# Patient Record
Sex: FEMALE | Race: WHITE | NOT HISPANIC OR LATINO | Employment: FULL TIME | URBAN - METROPOLITAN AREA
[De-identification: names, ages, dates, MRNs, and addresses within clinical notes are randomized per-mention and may not be internally consistent; named-entity substitution may affect disease eponyms.]

---

## 2023-04-30 PROBLEM — D50.9 IRON DEFICIENCY ANEMIA: Status: ACTIVE | Noted: 2023-04-30

## 2023-04-30 PROBLEM — R92.0 ABNORMAL FINDING ON MAMMOGRAPHY, MICROCALCIFICATION: Status: ACTIVE | Noted: 2023-04-30

## 2023-04-30 PROBLEM — E78.00 ELEVATED LDL CHOLESTEROL LEVEL: Status: ACTIVE | Noted: 2023-04-30

## 2023-04-30 PROBLEM — D64.9 ANEMIA: Status: ACTIVE | Noted: 2023-04-30

## 2023-04-30 PROBLEM — R20.9 SENSORY DISTURBANCE: Status: ACTIVE | Noted: 2023-04-30

## 2023-04-30 PROBLEM — N64.82 MICROMASTIA: Status: ACTIVE | Noted: 2023-04-30

## 2023-04-30 PROBLEM — K21.9 GERD (GASTROESOPHAGEAL REFLUX DISEASE): Status: ACTIVE | Noted: 2023-04-30

## 2023-04-30 PROBLEM — E55.9 VITAMIN D DEFICIENCY: Status: ACTIVE | Noted: 2023-04-30

## 2023-04-30 PROBLEM — K40.20 BILATERAL INGUINAL HERNIA: Status: ACTIVE | Noted: 2023-04-30

## 2023-04-30 PROBLEM — G43.909 MIGRAINE HEADACHE: Status: ACTIVE | Noted: 2023-04-30

## 2023-04-30 PROBLEM — R92.8 MAMMOGRAM ABNORMAL: Status: ACTIVE | Noted: 2023-04-30

## 2023-04-30 RX ORDER — ERGOCALCIFEROL 1.25 MG/1
50000 CAPSULE ORAL
COMMUNITY
Start: 2020-04-28 | End: 2023-05-03 | Stop reason: ALTCHOICE

## 2023-04-30 RX ORDER — SUMATRIPTAN SUCCINATE 100 MG/1
100 TABLET ORAL ONCE AS NEEDED
COMMUNITY
Start: 2013-04-16 | End: 2023-05-03 | Stop reason: SDUPTHER

## 2023-04-30 RX ORDER — FERROUS SULFATE 325(65) MG
65 TABLET, DELAYED RELEASE (ENTERIC COATED) ORAL
COMMUNITY
Start: 2017-02-14

## 2023-05-02 NOTE — PROGRESS NOTES
Subjective   Patient ID: Juli Samayoa is a 52 y.o. female who presents for her annual physical     She has moved into an apartment and is really happy. She feels she is an apartment person  She is getting . Her daughter still lives with her   Her children have been supportive     She is planning on scheduling the colonoscopy and a visit with dermatology     She started kick boxing in January     Her HA have improved     She had the Mirena removed and did not replace it   Her periods are sporadic     She is not taking Vitamin D supplements     She wears a panty liner daily for urinary leakage  Sometimes she will use a tampon and that helps with the leakage   She is annoyed by her Sx but is not ready to do anything about it yet     HEALTH:  PAP with Gyn DR Cerrato all good in 2015, 4/27/2020 and Q 5 with me  Mammo 10/16 abnormal and bx 11/16 with Dr Carias and + fibroadenoma , 1/2018, 3/19, 1/2021, 6/2022  BD never   Colon never and ordered 5/2021 and again 5/2022. She is going to see Dr Aaron Moulton cardiac score 2/17 and Zero  EKG 4/18, 5/2021, 5/2023  Urine 1/17 , 4/18, 4/19, 4/2020   FLu 2016, skipped 2017, 10/18, 11/19, 9/2021, 10/2022  TDAP 2014   MMR recommended with travel   Pneumovax never   Prevnar never   Shingrix 10/28/2022 - she was sick the following day and is going to schedule her second vaccine when she is able to be off the next day   Pfizer CVD 3/2/2021 and 3/23/2021 booster 9/26/2021, 5/21/2022, 10/14/2022  Ophth - Last seen in 5/2023. 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 No Hx of glaucoma or MD.          Review of Systems  All systems negative except those listed in the HPI      Past Medical, Surgical, and Family History reviewed and updated in chart.  Reviewed all medications by prescribing practitioner or clinical pharmacist   (such as prescriptions, OTCs, herbal therapies and supplements) and documented in the medical record.    Objective   Visit Vitals  /70 (BP Location:  Left arm, Patient Position: Sitting)   Pulse 68   Temp 36.4 °C (97.6 °F) (Oral)    Body mass index is 28.13 kg/m².     Physical Exam  Vitals reviewed.   Constitutional:       Appearance: Normal appearance.   HENT:      Head: Normocephalic and atraumatic.      Right Ear: Tympanic membrane, ear canal and external ear normal.      Left Ear: Tympanic membrane, ear canal and external ear normal.      Nose: Nose normal.      Mouth/Throat:      Pharynx: Oropharynx is clear.   Eyes:      Conjunctiva/sclera: Conjunctivae normal.   Cardiovascular:      Rate and Rhythm: Normal rate and regular rhythm.      Pulses: Normal pulses.      Heart sounds: Normal heart sounds.   Pulmonary:      Effort: Pulmonary effort is normal.      Breath sounds: Normal breath sounds.   Abdominal:      General: Bowel sounds are normal.      Palpations: Abdomen is soft.   Musculoskeletal:         General: Normal range of motion.      Cervical back: Normal range of motion and neck supple.   Skin:     General: Skin is warm.   Neurological:      General: No focal deficit present.      Mental Status: She is alert and oriented to person, place, and time.   Psychiatric:         Mood and Affect: Mood normal.         Behavior: Behavior normal.         Thought Content: Thought content normal.         Judgment: Judgment normal.       Assessment/Plan   Problem List Items Addressed This Visit       Elevated LDL cholesterol level    Iron deficiency anemia    Migraine headache    Relevant Medications    SUMAtriptan (Imitrex) 100 mg tablet    Vitamin D deficiency    Relevant Orders    Vitamin D, Total    Iron and TIBC     Other Visit Diagnoses       Healthcare maintenance    -  Primary    Relevant Orders    CBC    Comprehensive Metabolic Panel    Lipid Panel    TSH with reflex to Free T4 if abnormal    Vitamin D, Total    Irregular periods/menstrual cycles        Relevant Orders    FSH            Annual physical completed   Annual labs ordered   Medication  reconciliation performed with patient today     Health Maintenence completed  -  Discussed healthy diet and regular exercise.    -  Physical exam overall unremarkable. Immunizations reviewed and updated accordingly. Healthy lifestyle choices discussed (tobacco avoidance, appropriate alcohol use, avoidance of illicit substances).   -  Patient is wearing seatbelt.   -  Screening lab work ordered as indicated.    -  Age appropriate screening tests reviewed with patient.       Her weight in office today is 185 with BMI of 28.13. We spent 15 minutes discussing diet and weight loss. The struggle of weight loss persists   She started kick boxing in 2023    BP have been in normal range in office. We will continue to monitor.  EKG was normal, no LVH or strain pattern noted 2023  Recommend she monitor her BP at home periodically and call with elevated readings     I have spent 15 min face to face with this patient discussing their cardiac risk and behavioral therapies of nutrition choices and exercise. We are trying to eliminate habits that are contributing to their cardiac risk.  We agreed on a plan of how they can reduce their current CV risk   The patient's 10 yr CV risk was estimated at 1.5 %. There is no benefit to adding medications at this time 2023    Elevated lipids: Labs ordered, we will adjust if indicated 2023  Ca Cardiac score was Zero in , there was a small HH noted as well as a hypodense lesion in segment 2 of the liver measuring 3.1 x 2.7 cm.  Mom  from CAD at 62   Recommend she look into the DASH diet     Anemia: Labs ordered and we will adjust if indicated 2023  Continue Ferrous Sulfate 325 mg BID.   Make sure she takes this with something acidic.     Migraines: Improved. Her HA have lessened   Continue Imitrex prn and Naproxen 500 mg BID.   She is able to control her migraines well.  Avoid Topamax because of the brain affects for her     GERD: Resolved   This has been present since  she was diagnosed with the flu in 2/18.   She has hoarseness as well.   The HH may be contributing to her Sx but feel the weight is the issue     S/p laparoscopic bilateral inguinal hernia repair with mesh on 6/19/2020 w/ Dr Avery    She wears a panty liner daily for urinary leakage  Sometimes she will use a tampon and that helps with the leakage   She is annoyed by her Sx but is not ready to do anything about it yet   We will see what her FSH shows. We may consider adding estrogen vaginal cream     Recommend she begin seeing dermatology yearly for skin evaluations   She has noticed more moles on her skin in the past year   She is going to make an appt with derm in 2023.     Vitamin D deficiency:   Continue scripted Vitamin D 50K UT weekly     Sees Dr Silva in Gyn yearly and Pap was normal 4/2020 and Q 5 with me.   She had the Mirena removed in 5/2020.   Her periods are sporadic. FSH ordered 5/2023     Abnormal mammo left 10/16 and fibroma on bx 11/16 with Dr Carias   Mammo + rod normal in 6/2022 and ordered today 5/2023.  Breast exam normal today 5/2023    BD never  Colon never and ordered 5/2021 but not done. She is going to see Dr Walker in 2023    Ophth:  Last seen in 5/2023. 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   She will have her next eye exam faxed to my office in order to update her medical records.       FLu 2016, skipped 2017, 10/18, 11/19, 9/2021, 10/2022   TDAP 2014   MMR recommended with travel   Pneumovax never   Prevnar never   Shingrix 10/28/2022 - she was sick the following day and is going to schedule her second vaccine when she is able to be off the next day getting COVID booster   Pfizer CVD  3/2/2021 and 3/23/2021 booster 9/26/2021, 5/21/2022, 10/14/2022    RTC in 1 year for CPE or sooner if needed     Scribe Attestation  By signing my name below, ILa , Scribe   attest that this documentation has been prepared under the direction and in the presence of Maryjo Strickland MD.

## 2023-05-03 ENCOUNTER — OFFICE VISIT (OUTPATIENT)
Dept: PRIMARY CARE | Facility: CLINIC | Age: 53
End: 2023-05-03
Payer: COMMERCIAL

## 2023-05-03 VITALS
OXYGEN SATURATION: 100 % | BODY MASS INDEX: 28.04 KG/M2 | HEART RATE: 68 BPM | SYSTOLIC BLOOD PRESSURE: 120 MMHG | TEMPERATURE: 97.6 F | WEIGHT: 185 LBS | DIASTOLIC BLOOD PRESSURE: 70 MMHG | HEIGHT: 68 IN

## 2023-05-03 DIAGNOSIS — Z00.00 HEALTHCARE MAINTENANCE: ICD-10-CM

## 2023-05-03 DIAGNOSIS — Z12.83 SKIN CANCER SCREENING: ICD-10-CM

## 2023-05-03 DIAGNOSIS — N92.6 IRREGULAR PERIODS/MENSTRUAL CYCLES: ICD-10-CM

## 2023-05-03 DIAGNOSIS — E55.9 VITAMIN D DEFICIENCY: ICD-10-CM

## 2023-05-03 DIAGNOSIS — D50.9 IRON DEFICIENCY ANEMIA, UNSPECIFIED IRON DEFICIENCY ANEMIA TYPE: ICD-10-CM

## 2023-05-03 DIAGNOSIS — E78.00 ELEVATED LDL CHOLESTEROL LEVEL: Primary | ICD-10-CM

## 2023-05-03 DIAGNOSIS — G43.C1 INTRACTABLE PERIODIC HEADACHE SYNDROME: ICD-10-CM

## 2023-05-03 PROBLEM — D64.9 ANEMIA: Status: RESOLVED | Noted: 2023-04-30 | Resolved: 2023-05-03

## 2023-05-03 PROBLEM — R20.9 SENSORY DISTURBANCE: Status: RESOLVED | Noted: 2023-04-30 | Resolved: 2023-05-03

## 2023-05-03 PROBLEM — R92.0 ABNORMAL FINDING ON MAMMOGRAPHY, MICROCALCIFICATION: Status: RESOLVED | Noted: 2023-04-30 | Resolved: 2023-05-03

## 2023-05-03 PROBLEM — K21.9 GERD (GASTROESOPHAGEAL REFLUX DISEASE): Status: RESOLVED | Noted: 2023-04-30 | Resolved: 2023-05-03

## 2023-05-03 PROBLEM — R92.8 MAMMOGRAM ABNORMAL: Status: RESOLVED | Noted: 2023-04-30 | Resolved: 2023-05-03

## 2023-05-03 PROCEDURE — 99396 PREV VISIT EST AGE 40-64: CPT | Performed by: INTERNAL MEDICINE

## 2023-05-03 PROCEDURE — 1036F TOBACCO NON-USER: CPT | Performed by: INTERNAL MEDICINE

## 2023-05-03 PROCEDURE — 93000 ELECTROCARDIOGRAM COMPLETE: CPT | Performed by: INTERNAL MEDICINE

## 2023-05-03 RX ORDER — BENZONATATE 100 MG/1
100 CAPSULE ORAL EVERY 8 HOURS PRN
COMMUNITY
Start: 2018-02-03 | End: 2023-05-03 | Stop reason: ALTCHOICE

## 2023-05-03 RX ORDER — SUMATRIPTAN SUCCINATE 100 MG/1
TABLET ORAL
Qty: 9 TABLET | Refills: 3 | Status: SHIPPED | OUTPATIENT
Start: 2023-05-03 | End: 2023-07-24

## 2023-05-03 RX ORDER — GUAIFENESIN 600 MG/1
600 TABLET, EXTENDED RELEASE ORAL 2 TIMES DAILY
COMMUNITY
Start: 2018-02-03 | End: 2023-05-03 | Stop reason: ALTCHOICE

## 2023-07-12 ENCOUNTER — TELEPHONE (OUTPATIENT)
Dept: PRIMARY CARE | Facility: CLINIC | Age: 53
End: 2023-07-12
Payer: COMMERCIAL

## 2023-07-12 DIAGNOSIS — Z12.11 SCREEN FOR COLON CANCER: Primary | ICD-10-CM

## 2023-07-24 DIAGNOSIS — G43.C1 INTRACTABLE PERIODIC HEADACHE SYNDROME: ICD-10-CM

## 2023-07-24 RX ORDER — SUMATRIPTAN SUCCINATE 100 MG/1
TABLET ORAL
Qty: 27 TABLET | Refills: 3 | Status: SHIPPED | OUTPATIENT
Start: 2023-07-24

## 2023-07-24 RX ORDER — SUMATRIPTAN SUCCINATE 100 MG/1
TABLET ORAL
Qty: 27 TABLET | Refills: 3 | Status: SHIPPED | OUTPATIENT
Start: 2023-07-24 | End: 2023-07-24 | Stop reason: SDUPTHER

## 2023-10-27 ENCOUNTER — OFFICE VISIT (OUTPATIENT)
Dept: DERMATOLOGY | Facility: CLINIC | Age: 53
End: 2023-10-27

## 2023-10-27 DIAGNOSIS — L91.8 SKIN TAG: ICD-10-CM

## 2023-10-27 DIAGNOSIS — L82.1 SEBORRHEIC KERATOSIS: Primary | ICD-10-CM

## 2023-10-27 PROCEDURE — 1036F TOBACCO NON-USER: CPT | Performed by: DERMATOLOGY

## 2023-10-27 PROCEDURE — 17111 DESTRUCTION B9 LESIONS 15/>: CPT | Performed by: DERMATOLOGY

## 2023-10-27 ASSESSMENT — DERMATOLOGY QUALITY OF LIFE (QOL) ASSESSMENT
RATE HOW BOTHERED YOU ARE BY SYMPTOMS OF YOUR SKIN PROBLEM (EG, ITCHING, STINGING BURNING, HURTING OR SKIN IRRITATION): 0 - NEVER BOTHERED
RATE HOW BOTHERED YOU ARE BY EFFECTS OF YOUR SKIN PROBLEMS ON YOUR ACTIVITIES (EG, GOING OUT, ACCOMPLISHING WHAT YOU WANT, WORK ACTIVITIES OR YOUR RELATIONSHIPS WITH OTHERS): 0 - NEVER BOTHERED
ARE THERE EXCLUSIONS OR EXCEPTIONS FOR THE QUALITY OF LIFE ASSESSMENT: NO
RATE HOW EMOTIONALLY BOTHERED YOU ARE BY YOUR SKIN PROBLEM (FOR EXAMPLE, WORRY, EMBARRASSMENT, FRUSTRATION): 0 - NEVER BOTHERED
WHAT SINGLE SKIN CONDITION LISTED BELOW IS THE PATIENT ANSWERING THE QUALITY-OF-LIFE ASSESSMENT QUESTIONS ABOUT: NONE OF THE ABOVE

## 2023-10-27 ASSESSMENT — PATIENT GLOBAL ASSESSMENT (PGA): PATIENT GLOBAL ASSESSMENT: PATIENT GLOBAL ASSESSMENT:  1 - CLEAR

## 2023-10-27 ASSESSMENT — DERMATOLOGY PATIENT ASSESSMENT
DO YOU USE SUNSCREEN: DAILY
FOR PATIENTS COMING IN FOR A FOLLOW-UP VISIT - HAVE THERE BEEN ANY CHANGES IN YOUR HEALTH SINCE YOUR LAST VISIT: NO
ARE YOU TRYING TO GET PREGNANT: NO
DO YOU USE A TANNING BED: NO
DO YOU HAVE ANY NEW OR CHANGING LESIONS: NO
ARE YOU AN ORGAN TRANSPLANT RECIPIENT: NO

## 2023-10-27 ASSESSMENT — ITCH NUMERIC RATING SCALE: HOW SEVERE IS YOUR ITCHING?: 0

## 2023-10-27 NOTE — PROGRESS NOTES
Subjective     Juli Samayoa is a 53 y.o. female who presents for the following: Cosmetic (Pt is here to have lesions removed.Lesions are located on neck. ).     Review of Systems:  No other skin or systemic complaints other than what is documented elsewhere in the note.    The following portions of the chart were reviewed this encounter and updated as appropriate:          Skin Cancer History  No skin cancer on file.      Specialty Problems    None       Objective   Well appearing patient in no apparent distress; mood and affect are within normal limits.    A focused skin examination was performed. All findings within normal limits unless otherwise noted below.    Assessment/Plan   1. Seborrheic keratosis (20)  Neck - Anterior (20)  1-2mm brown stuck on appearing papules on the neck    The benign nature of these skin lesions reviewed, reassure provided and no further treatment needed at this time.   These lesions can be removed, if symptomatic (itching, bleeding, rubbing on clothing, painful), otherwise removal is considered cosmetic.     Destr of lesion - Neck - Anterior    Destruction method: electrodesiccation  Informed consent: discussed and consent obtained    Outcome: patient tolerated procedure well with no complications, wound care instructions provided    2. Skin tag (3)  Neck - Anterior (3)  Fleshy, skin-colored sessile and pedunculated papules.     Benign growths that most commonly occur in areas of friction and rubbing, specifically the neck, axilla, and inguinal creases. No treatment is necessary. If lesions are symptomatic, they can be removed, however regardless of symptoms some insurances may not cover this procedure.    Destr of lesion - Neck - Anterior (3)  Complexity: simple    Destruction method: scissors  Informed consent: discussed and consent obtained    Anesthesia: the lesion was anesthetized in a standard fashion    Anesthetic:  1% lidocaine w/ epinephrine 1-100,000 local  infiltration  Hemostasis achieved with:  electrodesiccation  Outcome: patient tolerated procedure with difficulty    Post-procedure details: vaseline and bandage applied. wound care instructions given     Follow up as needed.

## 2024-05-06 ENCOUNTER — APPOINTMENT (OUTPATIENT)
Dept: PRIMARY CARE | Facility: CLINIC | Age: 54
End: 2024-05-06

## 2024-06-09 DIAGNOSIS — Z00.00 HEALTHCARE MAINTENANCE: Primary | ICD-10-CM

## 2024-06-09 DIAGNOSIS — D50.9 IRON DEFICIENCY ANEMIA, UNSPECIFIED IRON DEFICIENCY ANEMIA TYPE: ICD-10-CM

## 2024-06-09 DIAGNOSIS — Z12.31 VISIT FOR SCREENING MAMMOGRAM: ICD-10-CM

## 2024-06-12 NOTE — PROGRESS NOTES
Subjective   Patient ID: Juli Samayoa is a 54 y.o. female who presents for her annual physical       She is . She has a 10 y/o child.  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     She saw podiatry in 12/23 and Dx with plantar wart   She had the wart treated       HEALTH:  PAP  2015, 4/20 and Q 5 with me  - She had the Mirena removed in 5/2020.   Mammo 1/18, 3/19, 1/21, 6/22  - Mammo 10/16 abnl and bx 11/16 fibroadenoma,    BD never   Colon never and ordered 5/2021, 5/2022 and again 6/2023  Ca cardiac score 2/17 and Zero  EKG 4/18, 5/21, 5/23  Urine 1/17, 4/18, 4/19, 4/20   Hep C ordered 6/2024   FLu  10/18, 11/19, 9/21, 10/22, 10/23  TDAP 2014, 6/13/2024  Pneumovax never   Prevnar never   Shingrix 10/28/2022 and 5/18/2023   Pfizer CVD 3/21 and 3/21 booster 9/21, 5/22, 10/22, 10/23  Ophth - Last seen in 5/2023. 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 No Hx of glaucoma or MD.       Review of Systems  All systems negative except those listed in the HPI      Past Medical, Surgical, and Family History reviewed and updated in chart.  Reviewed all medications by prescribing practitioner or clinical pharmacist   (such as prescriptions, OTCs, herbal therapies and supplements) and documented in the medical record       Objective   Visit Vitals  /60 (BP Location: Left arm, Patient Position: Sitting)   Pulse 82   Temp 36.3 °C (97.4 °F)    Body mass index is 29.98 kg/m².      Physical Exam  Vitals reviewed.   Constitutional:       Appearance: Normal appearance.   HENT:      Head: Normocephalic.      Right Ear: Tympanic membrane, ear canal and external ear normal.      Left Ear: Tympanic membrane, ear canal and external ear normal.      Nose: Nose normal.      Mouth/Throat:      Pharynx: Oropharynx is clear.   Eyes:      Conjunctiva/sclera: Conjunctivae normal.    Cardiovascular:      Rate and Rhythm: Normal rate and regular rhythm.      Pulses: Normal pulses.      Heart sounds: Normal heart sounds.   Pulmonary:      Effort: Pulmonary effort is normal.      Breath sounds: Normal breath sounds.   Abdominal:      General: Bowel sounds are normal.      Palpations: Abdomen is soft.   Musculoskeletal:         General: Normal range of motion.      Cervical back: Normal range of motion and neck supple.   Skin:     General: Skin is warm.   Neurological:      General: No focal deficit present.      Mental Status: She is alert and oriented to person, place, and time.   Psychiatric:         Mood and Affect: Mood normal.         Behavior: Behavior normal.         Thought Content: Thought content normal.         Judgment: Judgment normal.       Assessment/Plan   Problem List Items Addressed This Visit       Elevated LDL cholesterol level    Migraine headache     Other Visit Diagnoses       Healthcare maintenance    -  Primary    Relevant Orders    Tdap vaccine, age 7 years and older  (BOOSTRIX)    Encounter for hepatitis C screening test for low risk patient        Relevant Orders    Hepatitis C antibody    Colon cancer screening        Relevant Orders    Colonoscopy Screening; Average Risk Patient            Annual physical completed   Annual labs ordered        Health Maintenence completed  -  Discussed healthy diet and regular exercise.    -  Physical exam overall unremarkable. Immunizations reviewed and updated accordingly. Healthy lifestyle choices discussed (tobacco avoidance, appropriate alcohol use, avoidance of illicit substances).   -  Patient is wearing seatbelt.   -  Screening lab work ordered as indicated.    -  Age appropriate screening tests reviewed with patient.       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 We spent 15 minutes discussing diet and weight loss. The struggle of weight loss persists   Recommend she look into a plant based/ whole foods diet      BP have been in normal range in office. We will continue to monitor.  EKG was normal, no LVH or strain pattern noted 5/2023  Recommend she monitor her BP at home periodically and call with elevated readings      I have spent 15 min face to face with this patient discussing their cardiac risk  We discussed the patients cardiovascular risk. If needed, lifestyle modifications recommended including: behavioral therapies of nutrition choices, exercise and eliminate habits that are contributing to their cardiac risk. We agreed to a plan to decrease his cardiovascular risks. Discussed ASA. Reviewed Guidelines and approved recommendations made to patient.   The patient's 10 yr CV risk was estimated at  1.7 % 6/2024      Elevated lipids: Labs ordered, we will adjust if indicated 6/2024  Mom passed from CAD at 62    Explained goal for LDL to be less than 100 and ideally less than 70  Explained I would like to see her HDL between 55-60. Increase exercise   Ca Cardiac score was Zero in 2/17, there was a small HH noted as well as a hypodense lesion in segment 2 of the liver measuring 3.1 x 2.7 cm.  Recommend she look into a plant based/ whole foods diet      Anemia: Labs ordered and we will adjust if indicated 6/2024  She is no longer taking ferrous Sulfate   Recommend she take the iron supplement with something acidic.      Migraines: Improved. Her HA have improved   Continue Imitrex prn and Naproxen 500 mg BID.   She is able to control her migraines well.  Avoid Topamax because of the brain affects for her      - S/p lap bilateral inguinal hernia repair with mesh on 6/19/2020 w/ Dr Avery     Urinary leakage:   She wears a panty liner daily for urinary leakage  Sometimes she will use a tampon and that helps with the leakage   She is annoyed by  her Sx but is not ready to do anything about it yet     She saw podiatry in 12/23 and Dx with plantar wart   She had the wart treated   Explained she has to get the black spot out of the wart for it to resolve  She will return to podiatry for evaluation   Recommend she take an OTC Vitamin A until wart removes and stop. Recommend OTC Vitamin A 10K UT daily      Multiple seborrheic keratosis on neck and removal of anterior mole anterior and 3 skin tags removed from anterior neck with Dr Mora 10/23     Vitamin D deficiency:   Continue scripted Vitamin D 50K UT weekly      Pap was normal 4/2020 and Q 5 with me.   She had the Mirena removed in 5/2020.   Her periods are sporadic.       Abnormal mammo left 10/16 and fibroma on bx 11/16 with Dr Carias   Mammo + rod normal in 6/2022 and ordered today 5/2023.  Breast exam normal 6/2023      BD never  Colon never and ordered 5/2021, 5/2022 and again 6/2023      Ophth:  Last seen in 5/2023. 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   She will have her next eye exam faxed to my office in order to update her medical records.      Hep C ordered 6/2024   FLu  10/18, 11/19, 9/21, 10/22, 10/23  TDAP 2014, 6/13/2024  Pneumovax never   Prevnar never   Shingrix 10/28/2022 and 5/18/2023   Pfizer CVD 3/21 and 3/21 booster 9/21, 5/22, 10/22, 10/23    Some elements in the chart were copied from Dr. Strickland's last office visit with patient.   Notes have been updated where appropriate, and reflect my current medical decision making from today.        RTC in 1 year for CPE or sooner if needed   (CPE due 6/2025)      Scribe Attestation  By signing my name below, I, La Conte , Scribe   attest that this documentation has been prepared under the direction and in the presence of Maryjo Strickland MD.

## 2024-06-13 ENCOUNTER — APPOINTMENT (OUTPATIENT)
Dept: PRIMARY CARE | Facility: CLINIC | Age: 54
End: 2024-06-13
Payer: COMMERCIAL

## 2024-06-13 ENCOUNTER — LAB (OUTPATIENT)
Dept: LAB | Facility: LAB | Age: 54
End: 2024-06-13
Payer: COMMERCIAL

## 2024-06-13 VITALS
HEART RATE: 82 BPM | BODY MASS INDEX: 30.07 KG/M2 | HEIGHT: 69 IN | WEIGHT: 203 LBS | TEMPERATURE: 97.4 F | DIASTOLIC BLOOD PRESSURE: 60 MMHG | OXYGEN SATURATION: 98 % | SYSTOLIC BLOOD PRESSURE: 100 MMHG

## 2024-06-13 DIAGNOSIS — E78.00 ELEVATED LDL CHOLESTEROL LEVEL: ICD-10-CM

## 2024-06-13 DIAGNOSIS — D50.9 IRON DEFICIENCY ANEMIA, UNSPECIFIED IRON DEFICIENCY ANEMIA TYPE: ICD-10-CM

## 2024-06-13 DIAGNOSIS — Z11.59 ENCOUNTER FOR HEPATITIS C SCREENING TEST FOR LOW RISK PATIENT: ICD-10-CM

## 2024-06-13 DIAGNOSIS — Z12.11 COLON CANCER SCREENING: ICD-10-CM

## 2024-06-13 DIAGNOSIS — Z00.00 HEALTHCARE MAINTENANCE: Primary | ICD-10-CM

## 2024-06-13 DIAGNOSIS — G43.019 INTRACTABLE MIGRAINE WITHOUT AURA AND WITHOUT STATUS MIGRAINOSUS: ICD-10-CM

## 2024-06-13 DIAGNOSIS — Z00.00 HEALTHCARE MAINTENANCE: ICD-10-CM

## 2024-06-13 LAB
ALBUMIN SERPL BCP-MCNC: 4.4 G/DL (ref 3.4–5)
ALP SERPL-CCNC: 69 U/L (ref 33–110)
ALT SERPL W P-5'-P-CCNC: 13 U/L (ref 7–45)
ANION GAP SERPL CALC-SCNC: 12 MMOL/L (ref 10–20)
AST SERPL W P-5'-P-CCNC: 14 U/L (ref 9–39)
BILIRUB SERPL-MCNC: 0.4 MG/DL (ref 0–1.2)
BUN SERPL-MCNC: 19 MG/DL (ref 6–23)
CALCIUM SERPL-MCNC: 9.7 MG/DL (ref 8.6–10.6)
CHLORIDE SERPL-SCNC: 105 MMOL/L (ref 98–107)
CHOLEST SERPL-MCNC: 200 MG/DL (ref 0–199)
CHOLESTEROL/HDL RATIO: 3.5
CO2 SERPL-SCNC: 30 MMOL/L (ref 21–32)
CREAT SERPL-MCNC: 0.74 MG/DL (ref 0.5–1.05)
EGFRCR SERPLBLD CKD-EPI 2021: >90 ML/MIN/1.73M*2
ERYTHROCYTE [DISTWIDTH] IN BLOOD BY AUTOMATED COUNT: 14.4 % (ref 11.5–14.5)
FERRITIN SERPL-MCNC: 15 NG/ML (ref 8–150)
GLUCOSE SERPL-MCNC: 93 MG/DL (ref 74–99)
HCT VFR BLD AUTO: 40.9 % (ref 36–46)
HCV AB SER QL: NONREACTIVE
HDLC SERPL-MCNC: 56.5 MG/DL
HGB BLD-MCNC: 12.4 G/DL (ref 12–16)
IRON SATN MFR SERPL: 10 % (ref 25–45)
IRON SERPL-MCNC: 42 UG/DL (ref 35–150)
LDLC SERPL CALC-MCNC: 130 MG/DL
MCH RBC QN AUTO: 26.7 PG (ref 26–34)
MCHC RBC AUTO-ENTMCNC: 30.3 G/DL (ref 32–36)
MCV RBC AUTO: 88 FL (ref 80–100)
NON HDL CHOLESTEROL: 144 MG/DL (ref 0–149)
NRBC BLD-RTO: 0 /100 WBCS (ref 0–0)
PLATELET # BLD AUTO: 288 X10*3/UL (ref 150–450)
POTASSIUM SERPL-SCNC: 4 MMOL/L (ref 3.5–5.3)
PROT SERPL-MCNC: 6.7 G/DL (ref 6.4–8.2)
RBC # BLD AUTO: 4.64 X10*6/UL (ref 4–5.2)
SODIUM SERPL-SCNC: 143 MMOL/L (ref 136–145)
TIBC SERPL-MCNC: 429 UG/DL (ref 240–445)
TRIGL SERPL-MCNC: 69 MG/DL (ref 0–149)
TSH SERPL-ACNC: 1.31 MIU/L (ref 0.44–3.98)
UIBC SERPL-MCNC: 387 UG/DL (ref 110–370)
VLDL: 14 MG/DL (ref 0–40)
WBC # BLD AUTO: 4.8 X10*3/UL (ref 4.4–11.3)

## 2024-06-13 PROCEDURE — 84443 ASSAY THYROID STIM HORMONE: CPT

## 2024-06-13 PROCEDURE — 36415 COLL VENOUS BLD VENIPUNCTURE: CPT

## 2024-06-13 PROCEDURE — 85027 COMPLETE CBC AUTOMATED: CPT

## 2024-06-13 PROCEDURE — 83540 ASSAY OF IRON: CPT

## 2024-06-13 PROCEDURE — 80053 COMPREHEN METABOLIC PANEL: CPT

## 2024-06-13 PROCEDURE — 82728 ASSAY OF FERRITIN: CPT

## 2024-06-13 PROCEDURE — 83550 IRON BINDING TEST: CPT

## 2024-06-13 PROCEDURE — 86803 HEPATITIS C AB TEST: CPT

## 2024-06-13 PROCEDURE — 80061 LIPID PANEL: CPT

## 2024-06-13 ASSESSMENT — PATIENT HEALTH QUESTIONNAIRE - PHQ9
SUM OF ALL RESPONSES TO PHQ9 QUESTIONS 1 AND 2: 0
2. FEELING DOWN, DEPRESSED OR HOPELESS: NOT AT ALL
1. LITTLE INTEREST OR PLEASURE IN DOING THINGS: NOT AT ALL

## 2024-06-13 NOTE — PATIENT INSTRUCTIONS
Current weight: 92.1 kg (203 lb)  Weight change since last visit (-) denotes wt gain 18 lbs   Weight loss needed to achieve BMI 25: 34.1 Lbs  Weight loss needed to achieve BMI 30: 0.3 Lbs    It was a pleasure to see you today.  I would like to remind you about importance of a healthy lifestyle in order to improve your well-being and live longer. Try to engage in physical activities for at least 150 minutes per week.  Eat about 10 servings of fruits and vegetables daily. My advice is 2 servings of fruits and 8 servings of vegetables. For vegetables choose at least half of them green and at least half of them fresh.  Please avoid sugar, salt, fried food and saturated fat.  Weight loss is advised. Target BMI: below 25. Please follow low carbohydrate diet and daily exercise routine for at least 30 minutes. Nutritional consultation is available, please let me know if you are interested. I will be happy to discuss details with you if interested.   Have a good day and stay well.      Obesity is a chronic, relapsing, progressive, treatable, multifactorial, neurobehavioral disease, where in an increase in body fat promotes adipose (fat) tissue dysfunction, as well as biomechanical stress on the body which can result in adverse metabolic, biomechanical, and psychosocial health consequences, in addition to reducing quality of life and lifespan.   Without treatment, obesity is likely to progress and worsen, further increase the patient's risk for numerous health conditions caused by excess adiposity and increasing the risk for premature death.     - Counseling provided on impact of diet, physical activity, stress & sleep in treatment of obesity.    - Counseled on reduced calorie, high protein, high fiber, whole foods diet.  Counseling on benefits of avoidance of frequent intake of processed foods and liquid calories.   - Counseled on behavioral modification strategies and tools to support weight loss.   - Reviewed  pathophysiology of obesity in context of relationship to nutrition, energy balance and environmental factors that influence nutrition and energy balance outcomes.  - Counseled on various behavioral strategies and self monitoring practices to enhance understanding and individual assessment of energy balance, how various changes in types of foods consumed and macronutrient distribution can impact appetite regulation and be used as a tool in treatment of obesity.       The ability to age comfortably depends on how you invest in your body.   Include physical activity in your daily routine. Physical activity increases blood flow to your whole body, including your brain. ...  Eat a healthy diet. A heart-healthy diet may benefit your brain.   Stay mentally active. Be social.   Treat cardiovascular disease.  No smoking, excessive EtOH intake or illicit drug use.

## 2024-06-14 NOTE — RESULT ENCOUNTER NOTE
Hi Juli-  The iron levels are  a little on the low side still but the anemia is totally resolved now   The cholesterol is adequate but ideally want the LDL <100   Thyroid is good range   Hep C ab is negative   Rest of the labs are good range

## 2024-06-17 DIAGNOSIS — Z12.11 SCREENING FOR COLON CANCER: ICD-10-CM

## 2024-06-17 RX ORDER — POLYETHYLENE GLYCOL 3350, SODIUM SULFATE ANHYDROUS, SODIUM BICARBONATE, SODIUM CHLORIDE, POTASSIUM CHLORIDE 236; 22.74; 6.74; 5.86; 2.97 G/4L; G/4L; G/4L; G/4L; G/4L
4000 POWDER, FOR SOLUTION ORAL ONCE
Qty: 4000 ML | Refills: 0 | Status: SHIPPED | OUTPATIENT
Start: 2024-06-17 | End: 2024-06-17

## 2024-06-28 ENCOUNTER — APPOINTMENT (OUTPATIENT)
Dept: RADIOLOGY | Facility: CLINIC | Age: 54
End: 2024-06-28
Payer: COMMERCIAL

## 2024-07-11 ENCOUNTER — APPOINTMENT (OUTPATIENT)
Dept: GASTROENTEROLOGY | Facility: HOSPITAL | Age: 54
End: 2024-07-11
Payer: COMMERCIAL

## 2024-07-12 ENCOUNTER — HOSPITAL ENCOUNTER (OUTPATIENT)
Dept: RADIOLOGY | Facility: CLINIC | Age: 54
Discharge: HOME | End: 2024-07-12
Payer: COMMERCIAL

## 2024-07-12 VITALS — HEIGHT: 69 IN | BODY MASS INDEX: 29.62 KG/M2 | WEIGHT: 200 LBS

## 2024-07-12 DIAGNOSIS — Z12.31 VISIT FOR SCREENING MAMMOGRAM: ICD-10-CM

## 2024-07-12 PROCEDURE — 77067 SCR MAMMO BI INCL CAD: CPT

## 2024-08-21 DIAGNOSIS — G43.C1 INTRACTABLE PERIODIC HEADACHE SYNDROME: ICD-10-CM

## 2024-08-21 RX ORDER — SUMATRIPTAN SUCCINATE 100 MG/1
TABLET ORAL
Qty: 27 TABLET | Refills: 3 | Status: SHIPPED | OUTPATIENT
Start: 2024-08-21

## 2024-10-29 ENCOUNTER — TELEPHONE (OUTPATIENT)
Dept: PRIMARY CARE | Facility: CLINIC | Age: 54
End: 2024-10-29
Payer: COMMERCIAL

## 2024-10-29 DIAGNOSIS — R30.0 DYSURIA: Primary | ICD-10-CM

## 2024-10-29 RX ORDER — NITROFURANTOIN 25; 75 MG/1; MG/1
100 CAPSULE ORAL 2 TIMES DAILY
Qty: 14 CAPSULE | Refills: 0 | Status: SHIPPED | OUTPATIENT
Start: 2024-10-29 | End: 2024-11-05

## 2025-06-18 NOTE — PROGRESS NOTES
Subjective   Patient ID: Juli Samayoa is a 55 y.o. female who presents for her annual physical       She complains of congestion, rhinorrhea with thick mucous production   Her Sx have been present since approx 5/29/25. She did an Abx for 5 days   She has been afebrile. Now, she is more bothered by ST     She will make an appt with gyn 2025  Her periods are sporadic   She has no issues with vaginal dryness       HEALTH:  PAP  2015, 4/20 and Q 5   - She had the Mirena removed in 5/2020.   Mammo 1/18, 3/19, 1/21, 6/22, 7/24, ordered 6/25  - Mammo 10/16 abnl and bx 11/16 fibroadenoma,    BD never   Colon 7/24 nl and Q 10    Ca cardiac score 2/17 and Zero  EKG 4/18, 5/21, 5/23- repeat 2026  Urine 1/17, 4/18, 4/19, 4/20   Hep C normal 6/2024   FLu  11/19, 9/21, 10/22, 10/23, 10/24  TDAP 2014, 6/13/2024  Pneumovax never   Prevnar never   Shingrix 10/28/2022 and 5/18/2023   SARS-CoV-2-  3/21, 3/21, 9/21, 5/22, 10/22, 10/23, 10/24   Ophth - Last seen in 5/2023. 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 No Hx of glaucoma or MD.        Review of Systems  All systems negative except those listed in the HPI      Past Medical, Surgical, and Family History reviewed and updated in chart.  Reviewed all medications by prescribing practitioner or clinical pharmacist   (such as prescriptions, OTCs, herbal therapies and supplements) and documented in the medical record      Objective   Visit Vitals  /60 (BP Location: Right arm, Patient Position: Sitting)   Pulse 70   Temp 36.1 °C (97 °F) (Temporal)    Body mass index is 29.98 kg/m².     Physical Exam  Vitals reviewed.   Constitutional:       Appearance: Normal appearance.   HENT:      Head: Normocephalic.      Right Ear: Tympanic membrane and external ear normal.      Left Ear: Tympanic membrane, ear canal and external ear normal.      Ears:      Comments: Erythema right ear canal     Nose: Nose normal.      Mouth/Throat:      Pharynx: Oropharynx is clear.   Eyes:       Conjunctiva/sclera: Conjunctivae normal.   Cardiovascular:      Rate and Rhythm: Normal rate and regular rhythm.      Pulses: Normal pulses.      Heart sounds: Normal heart sounds.   Pulmonary:      Breath sounds: Normal breath sounds. No wheezing.   Chest:   Breasts:     Right: Normal.      Left: Normal.   Abdominal:      General: Bowel sounds are normal.      Palpations: Abdomen is soft.   Musculoskeletal:         General: Normal range of motion.      Cervical back: Normal range of motion and neck supple.   Skin:     General: Skin is warm.   Neurological:      General: No focal deficit present.      Mental Status: She is alert and oriented to person, place, and time.   Psychiatric:         Mood and Affect: Mood normal.         Behavior: Behavior normal.         Thought Content: Thought content normal.         Judgment: Judgment normal.       Assessment/Plan   Problem List Items Addressed This Visit       Elevated LDL cholesterol level    Migraine headache    Urinary incontinence     Other Visit Diagnoses         Healthcare maintenance    -  Primary    Relevant Orders    CBC    Comprehensive Metabolic Panel    TSH with reflex to Free T4 if abnormal    BI mammo bilateral screening tomosynthesis      Visit for screening mammogram        Relevant Orders    BI mammo bilateral screening tomosynthesis      Lipid screening        Relevant Orders    Lipid Panel      Diabetes mellitus screening        Relevant Orders    Hemoglobin A1C      Amenorrhea        Relevant Orders    FSH            Annual physical completed   Annual labs ordered         Health Maintenence completed  -  Discussed healthy diet and regular exercise.    -  Physical exam overall unremarkable. Immunizations reviewed and updated accordingly. Healthy lifestyle choices discussed (tobacco avoidance, appropriate alcohol use, avoidance of illicit substances).   -  Patient is wearing seatbelt.   -  Screening lab work ordered as indicated.    -  Age appropriate  screening tests reviewed with patient.        She is  with 5 children. She denies previous history of tobacco use   She owns a [employer] company   She is travelling a lot and living her best life     Acute UR Sx: On exam: erythema right ear canal, pulm exam normal 6/25. No infection noted, no Abx prescribed. Explained this could be allergies   She complains of congestion, rhinorrhea with thick mucous production   Her Sx have been present since approx 5/29/25. She did an Abx for 5 days   She has been afebrile. Now, she is more bothered by ST   Recommend OTC Flonase NS QHS until Sx resolve   Encouraged rest and increased hydration with warm/ tepid fluids   She will call if Sx persist or worsen      Her weight in office is 203 with BMI of 29.98, recommend she maintain  I would like to see her BMI as close to 25 as possible   Recommend she look into a plant based/ whole foods diet      BP have been in normal range in office. We will continue to monitor.  EKG 5/23 normal, no LVH or strain pattern noted    Recommend she monitor her BP at home and call with elevated readings      I have spent 15 min face to face with this patient discussing their cardiac risk  We discussed the patients cardiovascular risk. If needed, lifestyle modifications recommended including: behavioral therapies of nutrition choices, exercise and eliminate habits that are contributing to their cardiac risk. We agreed to a plan to decrease his cardiovascular risks. Discussed ASA. Reviewed Guidelines and approved recommendations made to patient.   The patient's 10 yr CV risk was estimated at  1.5 % IO 6/25     Elevated lipids: Labs ordered, we will adjust if indicated 6/25  Mom passed from CAD at 62    Explained goal for LDL to be less than 100 and ideally less than 70  Explained I would like to see her HDL between 55-60. Increase exercise   Ca Cardiac score was Zero in 2/17, there was a small HH noted as well as a hypodense lesion in segment 2  of the liver measuring 3.1 x 2.7 cm.  Recommend she look into a plant based/ whole foods diet      Anemia: Labs ordered and we will adjust if indicated 6/25  She is no longer taking ferrous Sulfate   Recommend she take the iron supplement with something acidic.      Migraines: Improved. Her HA have improved   She is no longer taking Imitrex prn and Naproxen    She failed Topamax due to brain effects   She is able to control her migraines well.     - S/p lap bilateral inguinal hernia repair with mesh on 6/19/2020 w/ Dr Avery     Urinary leakage:   She wears a panty liner daily for urinary leakage  Sometimes she will use a tampon and that helps with the leakage   She is annoyed by her Sx but is not ready to do anything about it yet      She saw podiatry in 12/23 and Dx with plantar wart   She had the wart treated   Explained she has to get the black spot out of the wart for it to resolve  She will return to podiatry for evaluation   Recommend OTC Vitamin A 10K UT daily      Multiple seborrheic keratosis on neck and removal of mole and 3 skin tags from anterior neck with Dr Mora 10/23     Vitamin D deficiency:   Discontinue scripted Vitamin D due to concerns for bone loss with higher doses   Recommend taking OTC Vitamin D3 5000 UT daily      Pap was normal 4/2020 and Q 5. She will make an appt with gyn 2025   She had the Mirena removed in 5/2020.   Her periods are sporadic. FSH ordered 6/25     Abnormal mammo left 10/16 and fibroma on bx 11/16 with Dr Carias   Mammo normal in 7/24 and ordered 6/25  Breast exam normal 6/25      BD never    Colon 7/24 normal and Q 10      Ophth:  Last seen in 5/2023. 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   She will have her next eye exam faxed to my office in order to update her medical records.      Hep C normal 6/2024   FLu  11/19, 9/21, 10/22, 10/23, 10/24  TDAP 2014, 6/13/2024  Pneumovax never   Prevnar never   Shingrix 10/28/2022 and 5/18/2023   SARS-CoV-2-  3/21, 3/21, 9/21,  5/22, 10/22, 10/23, 10/24      Some elements in the chart were copied from Dr. Strickland's last office visit with patient. Notes have been updated where appropriate, and reflect my current medical decision making from today.        RTC in 1 year for CPE or sooner if needed   (CPE due 6/26)      Scribe Attestation  By signing my name below, I, La Dg, Scribe attest that this documentation has been prepared under the direction and in the presence of Maryjo Strickland MD.

## 2025-06-19 ENCOUNTER — APPOINTMENT (OUTPATIENT)
Dept: PRIMARY CARE | Facility: CLINIC | Age: 55
End: 2025-06-19
Payer: COMMERCIAL

## 2025-06-19 VITALS
BODY MASS INDEX: 30.07 KG/M2 | WEIGHT: 203 LBS | DIASTOLIC BLOOD PRESSURE: 60 MMHG | HEART RATE: 70 BPM | SYSTOLIC BLOOD PRESSURE: 118 MMHG | OXYGEN SATURATION: 98 % | TEMPERATURE: 97 F | HEIGHT: 69 IN

## 2025-06-19 DIAGNOSIS — Z12.31 VISIT FOR SCREENING MAMMOGRAM: ICD-10-CM

## 2025-06-19 DIAGNOSIS — N91.2 AMENORRHEA: ICD-10-CM

## 2025-06-19 DIAGNOSIS — R32 URINARY INCONTINENCE, UNSPECIFIED TYPE: ICD-10-CM

## 2025-06-19 DIAGNOSIS — Z13.220 LIPID SCREENING: ICD-10-CM

## 2025-06-19 DIAGNOSIS — E78.00 ELEVATED LDL CHOLESTEROL LEVEL: ICD-10-CM

## 2025-06-19 DIAGNOSIS — G43.019 INTRACTABLE MIGRAINE WITHOUT AURA AND WITHOUT STATUS MIGRAINOSUS: ICD-10-CM

## 2025-06-19 DIAGNOSIS — Z00.00 HEALTHCARE MAINTENANCE: Primary | ICD-10-CM

## 2025-06-19 DIAGNOSIS — Z13.1 DIABETES MELLITUS SCREENING: ICD-10-CM

## 2025-06-19 PROCEDURE — 3008F BODY MASS INDEX DOCD: CPT | Performed by: INTERNAL MEDICINE

## 2025-06-19 PROCEDURE — 1036F TOBACCO NON-USER: CPT | Performed by: INTERNAL MEDICINE

## 2025-06-19 PROCEDURE — 99396 PREV VISIT EST AGE 40-64: CPT | Performed by: INTERNAL MEDICINE

## 2025-06-19 ASSESSMENT — PATIENT HEALTH QUESTIONNAIRE - PHQ9
SUM OF ALL RESPONSES TO PHQ9 QUESTIONS 1 AND 2: 0
1. LITTLE INTEREST OR PLEASURE IN DOING THINGS: NOT AT ALL
2. FEELING DOWN, DEPRESSED OR HOPELESS: NOT AT ALL

## 2025-06-19 NOTE — PATIENT INSTRUCTIONS
It was a pleasure to see you today.  I would like to remind you about importance of a healthy lifestyle in order to improve your well-being and live longer. Try to engage in physical activities for at least 150 minutes per week.  Eat about 10 servings of fruits and vegetables daily. My advice is 2 servings of fruits and 8 servings of vegetables. For vegetables choose at least half of them green and at least half of them fresh.  Please avoid sugar, salt, fried food and saturated fat.  Weight loss is advised. Target BMI: below 25. Please follow low carbohydrate diet and daily exercise routine for at least 30 minutes. Nutritional consultation is available, please let me know if you are interested. I will be happy to discuss details with you if interested.   Have a good day and stay well.      The ability to age comfortably depends on how you invest in your body.   Include physical activity in your daily routine.   Physical activity increases blood flow to your whole body, including your brain. ...  Eat a healthy diet. A heart-healthy diet may benefit your brain.   Stay mentally active. Be social.   Treat cardiovascular disease.  No smoking, excessive EtOH intake or illicit drug use.

## 2025-06-20 DIAGNOSIS — N95.0 POST-MENOPAUSAL BLEEDING: Primary | ICD-10-CM

## 2025-06-20 LAB
ALBUMIN SERPL-MCNC: 4.4 G/DL (ref 3.6–5.1)
ALP SERPL-CCNC: 53 U/L (ref 37–153)
ALT SERPL-CCNC: 15 U/L (ref 6–29)
ANION GAP SERPL CALCULATED.4IONS-SCNC: 10 MMOL/L (CALC) (ref 7–17)
AST SERPL-CCNC: 16 U/L (ref 10–35)
BILIRUB SERPL-MCNC: 0.5 MG/DL (ref 0.2–1.2)
BUN SERPL-MCNC: 16 MG/DL (ref 7–25)
CALCIUM SERPL-MCNC: 9.4 MG/DL (ref 8.6–10.4)
CHLORIDE SERPL-SCNC: 104 MMOL/L (ref 98–110)
CHOLEST SERPL-MCNC: 192 MG/DL
CHOLEST/HDLC SERPL: 3.8 (CALC)
CO2 SERPL-SCNC: 29 MMOL/L (ref 20–32)
CREAT SERPL-MCNC: 0.72 MG/DL (ref 0.5–1.03)
EGFRCR SERPLBLD CKD-EPI 2021: 99 ML/MIN/1.73M2
ERYTHROCYTE [DISTWIDTH] IN BLOOD BY AUTOMATED COUNT: 13.1 % (ref 11–15)
EST. AVERAGE GLUCOSE BLD GHB EST-MCNC: 123 MG/DL
EST. AVERAGE GLUCOSE BLD GHB EST-SCNC: 6.8 MMOL/L
FSH SERPL-ACNC: 96.7 MIU/ML
GLUCOSE SERPL-MCNC: 107 MG/DL (ref 65–99)
HBA1C MFR BLD: 5.9 %
HCT VFR BLD AUTO: 41.7 % (ref 35–45)
HDLC SERPL-MCNC: 51 MG/DL
HGB BLD-MCNC: 13.2 G/DL (ref 11.7–15.5)
LDLC SERPL CALC-MCNC: 121 MG/DL (CALC)
MCH RBC QN AUTO: 28.4 PG (ref 27–33)
MCHC RBC AUTO-ENTMCNC: 31.7 G/DL (ref 32–36)
MCV RBC AUTO: 89.9 FL (ref 80–100)
NONHDLC SERPL-MCNC: 141 MG/DL (CALC)
PLATELET # BLD AUTO: 297 THOUSAND/UL (ref 140–400)
PMV BLD REES-ECKER: 11.9 FL (ref 7.5–12.5)
POTASSIUM SERPL-SCNC: 4.1 MMOL/L (ref 3.5–5.3)
PROT SERPL-MCNC: 6.8 G/DL (ref 6.1–8.1)
RBC # BLD AUTO: 4.64 MILLION/UL (ref 3.8–5.1)
SODIUM SERPL-SCNC: 143 MMOL/L (ref 135–146)
TRIGL SERPL-MCNC: 96 MG/DL
TSH SERPL-ACNC: 1.17 MIU/L
WBC # BLD AUTO: 5.1 THOUSAND/UL (ref 3.8–10.8)

## 2025-07-24 ENCOUNTER — HOSPITAL ENCOUNTER (OUTPATIENT)
Dept: RADIOLOGY | Facility: CLINIC | Age: 55
Discharge: HOME | End: 2025-07-24
Payer: COMMERCIAL

## 2025-07-24 DIAGNOSIS — Z12.31 VISIT FOR SCREENING MAMMOGRAM: ICD-10-CM

## 2025-07-24 DIAGNOSIS — Z00.00 HEALTHCARE MAINTENANCE: ICD-10-CM

## 2025-07-24 PROCEDURE — 77063 BREAST TOMOSYNTHESIS BI: CPT

## 2025-07-24 PROCEDURE — 77063 BREAST TOMOSYNTHESIS BI: CPT | Performed by: RADIOLOGY

## 2025-07-24 PROCEDURE — 77067 SCR MAMMO BI INCL CAD: CPT | Performed by: RADIOLOGY

## 2025-08-12 ENCOUNTER — APPOINTMENT (OUTPATIENT)
Dept: RADIOLOGY | Facility: CLINIC | Age: 55
End: 2025-08-12
Payer: COMMERCIAL

## 2025-08-12 DIAGNOSIS — R92.8 ABNORMAL MAMMOGRAM: ICD-10-CM

## 2025-08-12 PROCEDURE — 77065 DX MAMMO INCL CAD UNI: CPT | Mod: LEFT SIDE | Performed by: RADIOLOGY

## 2025-08-12 PROCEDURE — 77065 DX MAMMO INCL CAD UNI: CPT | Mod: LT

## 2025-08-22 ENCOUNTER — HOSPITAL ENCOUNTER (OUTPATIENT)
Dept: RADIOLOGY | Facility: HOSPITAL | Age: 55
Discharge: HOME | End: 2025-08-22
Payer: COMMERCIAL

## 2025-08-22 ENCOUNTER — PROCEDURE VISIT (OUTPATIENT)
Dept: SURGICAL ONCOLOGY | Facility: HOSPITAL | Age: 55
End: 2025-08-22
Payer: COMMERCIAL

## 2025-08-22 VITALS
HEART RATE: 76 BPM | WEIGHT: 194 LBS | HEIGHT: 69 IN | DIASTOLIC BLOOD PRESSURE: 88 MMHG | BODY MASS INDEX: 28.73 KG/M2 | SYSTOLIC BLOOD PRESSURE: 132 MMHG | RESPIRATION RATE: 16 BRPM

## 2025-08-22 DIAGNOSIS — R92.1 CALCIFICATION OF BREAST: ICD-10-CM

## 2025-08-22 DIAGNOSIS — R92.8 ABNORMAL FINDING ON BREAST IMAGING: Primary | ICD-10-CM

## 2025-08-22 PROCEDURE — 19081 BX BREAST 1ST LESION STRTCTC: CPT | Mod: LT

## 2025-08-22 PROCEDURE — 99204 OFFICE O/P NEW MOD 45 MIN: CPT | Performed by: NURSE PRACTITIONER

## 2025-08-22 PROCEDURE — 2500000004 HC RX 250 GENERAL PHARMACY W/ HCPCS (ALT 636 FOR OP/ED): Performed by: NURSE PRACTITIONER

## 2025-08-22 PROCEDURE — C1739 HC OR 278 NO HCPCS: HCPCS

## 2025-08-22 PROCEDURE — 2780000003 HC OR 278 NO HCPCS

## 2025-08-22 PROCEDURE — 2720000007 HC OR 272 NO HCPCS

## 2025-08-22 PROCEDURE — 99214 OFFICE O/P EST MOD 30 MIN: CPT | Performed by: NURSE PRACTITIONER

## 2025-08-22 RX ADMIN — Medication 20 ML: at 14:19

## 2025-08-22 ASSESSMENT — ENCOUNTER SYMPTOMS
ENDOCRINE NEGATIVE: 1
MUSCULOSKELETAL NEGATIVE: 1
CARDIOVASCULAR NEGATIVE: 1
NEUROLOGICAL NEGATIVE: 1
PSYCHIATRIC NEGATIVE: 1
HEMATOLOGIC/LYMPHATIC NEGATIVE: 1
RESPIRATORY NEGATIVE: 1
EYES NEGATIVE: 1
GASTROINTESTINAL NEGATIVE: 1

## 2025-08-28 LAB
LABORATORY COMMENT REPORT: NORMAL
PATH REPORT.COMMENTS IMP SPEC: NORMAL
PATH REPORT.FINAL DX SPEC: NORMAL
PATH REPORT.GROSS SPEC: NORMAL
PATH REPORT.RELEVANT HX SPEC: NORMAL
PATH REPORT.TOTAL CANCER: NORMAL

## 2025-08-29 ENCOUNTER — RESULTS FOLLOW-UP (OUTPATIENT)
Dept: SURGICAL ONCOLOGY | Facility: HOSPITAL | Age: 55
End: 2025-08-29
Payer: COMMERCIAL

## 2026-06-25 ENCOUNTER — APPOINTMENT (OUTPATIENT)
Dept: PRIMARY CARE | Facility: CLINIC | Age: 56
End: 2026-06-25
Payer: COMMERCIAL